# Patient Record
Sex: MALE | Race: WHITE | NOT HISPANIC OR LATINO | Employment: OTHER | URBAN - METROPOLITAN AREA
[De-identification: names, ages, dates, MRNs, and addresses within clinical notes are randomized per-mention and may not be internally consistent; named-entity substitution may affect disease eponyms.]

---

## 2023-09-03 PROBLEM — E83.52 HYPERCALCEMIA: Status: ACTIVE | Noted: 2023-09-03

## 2023-09-03 PROBLEM — K21.9 GASTROESOPHAGEAL REFLUX: Status: ACTIVE | Noted: 2023-09-03

## 2023-09-03 PROBLEM — K40.90 LEFT INGUINAL HERNIA: Status: ACTIVE | Noted: 2023-09-03

## 2023-09-03 PROBLEM — E11.9 DIABETES MELLITUS (MULTI): Status: ACTIVE | Noted: 2023-09-03

## 2023-09-03 PROBLEM — J45.909 ASTHMA (HHS-HCC): Status: ACTIVE | Noted: 2023-09-03

## 2023-09-03 PROBLEM — I48.0 PAROXYSMAL A-FIB (MULTI): Status: ACTIVE | Noted: 2023-09-03

## 2023-09-03 PROBLEM — J22 LOWER RESPIRATORY INFECTION (E.G., BRONCHITIS, PNEUMONIA, PNEUMONITIS, PULMONITIS): Status: ACTIVE | Noted: 2023-09-03

## 2023-09-03 PROBLEM — R07.89 NON-CARDIAC CHEST PAIN: Status: ACTIVE | Noted: 2023-09-03

## 2023-09-03 PROBLEM — E78.00 HYPERCHOLESTEROLEMIA: Status: ACTIVE | Noted: 2023-09-03

## 2023-09-03 PROBLEM — I25.10 MILD CAD: Status: ACTIVE | Noted: 2023-09-03

## 2023-09-03 PROBLEM — Z95.3 S/P AORTIC VALVE REPLACEMENT WITH BIOPROSTHETIC VALVE: Status: ACTIVE | Noted: 2023-09-03

## 2023-09-03 PROBLEM — R06.02 SHORTNESS OF BREATH: Status: ACTIVE | Noted: 2023-09-03

## 2023-09-03 PROBLEM — E21.0 PRIMARY HYPERPARATHYROIDISM (MULTI): Status: ACTIVE | Noted: 2023-09-03

## 2023-09-03 PROBLEM — E83.41 HYPERMAGNESEMIA: Status: ACTIVE | Noted: 2023-09-03

## 2023-09-03 PROBLEM — I10 HYPERTENSION, BENIGN: Status: ACTIVE | Noted: 2023-09-03

## 2023-09-03 RX ORDER — CINACALCET 30 MG/1
90 TABLET, FILM COATED ORAL
COMMUNITY

## 2023-09-03 RX ORDER — ASPIRIN 81 MG/1
1 TABLET ORAL DAILY
COMMUNITY

## 2023-09-03 RX ORDER — LANOLIN ALCOHOL/MO/W.PET/CERES
1 CREAM (GRAM) TOPICAL DAILY
COMMUNITY
Start: 2021-12-21 | End: 2023-10-13 | Stop reason: ALTCHOICE

## 2023-09-03 RX ORDER — METOPROLOL TARTRATE 50 MG/1
1 TABLET ORAL EVERY 12 HOURS
COMMUNITY
Start: 2019-02-27

## 2023-09-03 RX ORDER — TRAVOPROST OPHTHALMIC SOLUTION 0.04 MG/ML
1 SOLUTION OPHTHALMIC DAILY
COMMUNITY
Start: 2017-03-02 | End: 2023-11-17 | Stop reason: ALTCHOICE

## 2023-09-03 RX ORDER — TAMSULOSIN HYDROCHLORIDE 0.4 MG/1
1 CAPSULE ORAL DAILY
COMMUNITY

## 2023-09-03 RX ORDER — METFORMIN HYDROCHLORIDE 500 MG/1
1 TABLET, EXTENDED RELEASE ORAL 2 TIMES DAILY
COMMUNITY
Start: 2017-01-18

## 2023-09-03 RX ORDER — ALBUTEROL SULFATE 90 UG/1
AEROSOL, METERED RESPIRATORY (INHALATION)
COMMUNITY
End: 2023-11-17 | Stop reason: ALTCHOICE

## 2023-09-03 RX ORDER — DOFETILIDE 0.25 MG/1
1 CAPSULE ORAL 2 TIMES DAILY
COMMUNITY
Start: 2018-12-17

## 2023-09-03 RX ORDER — WARFARIN 3 MG/1
1 TABLET ORAL DAILY
COMMUNITY
End: 2023-10-02 | Stop reason: DRUGHIGH

## 2023-09-03 RX ORDER — CALCIUM CARBONATE 300MG(750)
400 TABLET,CHEWABLE ORAL 2 TIMES DAILY
COMMUNITY
End: 2023-11-17 | Stop reason: SDUPTHER

## 2023-09-03 RX ORDER — ROSUVASTATIN CALCIUM 5 MG/1
1 TABLET, COATED ORAL DAILY
COMMUNITY
Start: 2020-03-23 | End: 2023-10-11 | Stop reason: SDUPTHER

## 2023-09-03 RX ORDER — LISINOPRIL 40 MG/1
20 TABLET ORAL DAILY
COMMUNITY

## 2023-09-03 RX ORDER — HYDROCHLOROTHIAZIDE 25 MG/1
1 TABLET ORAL DAILY
COMMUNITY
Start: 2022-08-30 | End: 2023-10-13 | Stop reason: ALTCHOICE

## 2023-09-03 RX ORDER — BUDESONIDE AND FORMOTEROL FUMARATE DIHYDRATE 160; 4.5 UG/1; UG/1
AEROSOL RESPIRATORY (INHALATION)
COMMUNITY
Start: 2021-11-03 | End: 2023-10-13 | Stop reason: ALTCHOICE

## 2023-10-02 DIAGNOSIS — I48.21 PERMANENT ATRIAL FIBRILLATION (MULTI): Primary | ICD-10-CM

## 2023-10-02 RX ORDER — WARFARIN 3 MG/1
TABLET ORAL
Qty: 130 TABLET | Refills: 1 | Status: SHIPPED | OUTPATIENT
Start: 2023-10-02 | End: 2024-03-28 | Stop reason: DRUGHIGH

## 2023-10-10 ENCOUNTER — OFFICE VISIT (OUTPATIENT)
Dept: CARDIOLOGY | Facility: CLINIC | Age: 83
End: 2023-10-10
Payer: MEDICARE

## 2023-10-10 VITALS
OXYGEN SATURATION: 97 % | SYSTOLIC BLOOD PRESSURE: 130 MMHG | BODY MASS INDEX: 31.1 KG/M2 | WEIGHT: 169 LBS | HEART RATE: 54 BPM | DIASTOLIC BLOOD PRESSURE: 74 MMHG | HEIGHT: 62 IN

## 2023-10-10 DIAGNOSIS — I48.0 PAROXYSMAL A-FIB (MULTI): ICD-10-CM

## 2023-10-10 DIAGNOSIS — Z95.3 S/P AORTIC VALVE REPLACEMENT WITH BIOPROSTHETIC VALVE: ICD-10-CM

## 2023-10-10 DIAGNOSIS — I25.10 MILD CAD: Primary | ICD-10-CM

## 2023-10-10 DIAGNOSIS — I10 HYPERTENSION, BENIGN: ICD-10-CM

## 2023-10-10 PROCEDURE — 3078F DIAST BP <80 MM HG: CPT | Performed by: STUDENT IN AN ORGANIZED HEALTH CARE EDUCATION/TRAINING PROGRAM

## 2023-10-10 PROCEDURE — 1126F AMNT PAIN NOTED NONE PRSNT: CPT | Performed by: STUDENT IN AN ORGANIZED HEALTH CARE EDUCATION/TRAINING PROGRAM

## 2023-10-10 PROCEDURE — 93000 ELECTROCARDIOGRAM COMPLETE: CPT | Performed by: STUDENT IN AN ORGANIZED HEALTH CARE EDUCATION/TRAINING PROGRAM

## 2023-10-10 PROCEDURE — 3075F SYST BP GE 130 - 139MM HG: CPT | Performed by: STUDENT IN AN ORGANIZED HEALTH CARE EDUCATION/TRAINING PROGRAM

## 2023-10-10 PROCEDURE — 99213 OFFICE O/P EST LOW 20 MIN: CPT | Performed by: STUDENT IN AN ORGANIZED HEALTH CARE EDUCATION/TRAINING PROGRAM

## 2023-10-10 PROCEDURE — 1036F TOBACCO NON-USER: CPT | Performed by: STUDENT IN AN ORGANIZED HEALTH CARE EDUCATION/TRAINING PROGRAM

## 2023-10-10 RX ORDER — BRIMONIDINE TARTRATE 1 MG/ML
1 SOLUTION/ DROPS OPHTHALMIC 2 TIMES DAILY
COMMUNITY

## 2023-10-10 RX ORDER — BLOOD SUGAR DIAGNOSTIC
1 STRIP MISCELLANEOUS DAILY
COMMUNITY
Start: 2023-01-05

## 2023-10-10 RX ORDER — ASPIRIN 81 MG/1
81 TABLET ORAL ONCE
COMMUNITY
Start: 2018-02-01 | End: 2023-10-13 | Stop reason: SDUPTHER

## 2023-10-10 RX ORDER — FLUTICASONE PROPIONATE 220 UG/1
1 AEROSOL, METERED RESPIRATORY (INHALATION)
COMMUNITY
Start: 2013-09-13 | End: 2023-11-17 | Stop reason: ALTCHOICE

## 2023-10-10 RX ORDER — FLUTICASONE FUROATE AND VILANTEROL TRIFENATATE 200; 25 UG/1; UG/1
1 POWDER RESPIRATORY (INHALATION) DAILY
COMMUNITY

## 2023-10-10 RX ORDER — LATANOPROST 50 UG/ML
1 SOLUTION/ DROPS OPHTHALMIC DAILY
COMMUNITY

## 2023-10-10 ASSESSMENT — PAIN SCALES - GENERAL: PAINLEVEL: 0-NO PAIN

## 2023-10-10 NOTE — PROGRESS NOTES
"Chief Complaint:   6 month f/u EKG also needs refills     History Of Present Illness:    Rios Alston Jr. is a 83 y.o. male presenting for follow-up appointment.  He denies chest pain shortness of breath palpitations.  Has been in his usual state of health since his last appointment.  No recent hospitalizations.  He is compliant with his current meds.  He does have a dental appointment coming up for tooth extraction.     Last Recorded Vitals:  Vitals:    10/10/23 1057   BP: 130/74   BP Location: Right arm   Patient Position: Sitting   BP Cuff Size: Adult   Pulse: 54   SpO2: 97%   Weight: 76.7 kg (169 lb)   Height: 1.575 m (5' 2\")       Past Medical History:  He has a past medical history of Personal history of other medical treatment, Personal history of other medical treatment, Personal history of other medical treatment, Personal history of other medical treatment, Personal history of other medical treatment, and Pleurodynia (09/22/2021).    Past Surgical History:  He has a past surgical history that includes Colonoscopy (01/11/2017) and Other surgical history (01/17/2018).      Social History:  He reports that he has never smoked. He has never used smokeless tobacco. No history on file for alcohol use and drug use.    Family History:  Family History   Problem Relation Name Age of Onset    Other (cardiac disease) Other Family Members         Multiple family members        Allergies:  Patient has no known allergies.    Outpatient Medications:  Current Outpatient Medications   Medication Instructions    Accu-Chek Marisela Plus test strp strip 1 each, subdermal, Daily    albuterol (Ventolin HFA) 90 mcg/actuation inhaler inhalation    Alphagan P 0.1 % ophthalmic solution 1 drop, Both Eyes, 2 times daily    aspirin 81 mg EC tablet 1 tablet, oral, Daily    aspirin 81 mg, oral, Once    Breo Ellipta 200-25 mcg/dose inhaler 1 puff, inhalation, Daily    budesonide-formoteroL (Symbicort) 160-4.5 mcg/actuation inhaler " inhalation    cinacalcet (SENSIPAR) 90 mg, oral, Weekly, Take with food or shortly afer a meal. Swallow tablet whole; do not break or divide.    dofetilide (Tikosyn) 250 mcg capsule 1 capsule, oral, 2 times daily    fluticasone (Flovent HFA) 220 mcg/actuation inhaler 1 puff, inhalation, 2 times daily RT    hydroCHLOROthiazide (HYDRODiuril) 25 mg tablet 1 tablet, oral, Daily    latanoprost (Xalatan) 0.005 % ophthalmic solution 1 drop, Both Eyes, Daily    lisinopril 40 mg tablet 1 tablet, oral, Daily    magnesium oxide (Mag-Ox) 400 mg (241.3 mg magnesium) tablet 1 tablet, oral, Daily    magnesium oxide (MAG-OX) 400 mg, oral, 2 times daily    metFORMIN  mg 24 hr tablet 1 tablet, oral, 2 times daily    metoprolol tartrate (Lopressor) 50 mg tablet 1 tablet, oral, Every 12 hours    rosuvastatin (Crestor) 5 mg tablet 1 tablet, oral, Daily    tamsulosin (Flomax) 0.4 mg 24 hr capsule 1 capsule, oral, Daily    travoprost (Travatan Z) 0.004 % drops ophthalmic solution 1 drop, ophthalmic (eye), Daily    warfarin (Coumadin) 3 mg tablet Take as directed per After Visit Summary.       Physical Exam:  General: No acute distress,  A&O x3  Skin: Warm and dry  Neck: JVD is not elevated  ENT: Moist mucous membranes no lesions appreciated  Pulmonary: CTAB  Cards: Regular rate rhythm, a 2 out of 6 to 3 out of 6 systolic murmur appreciated across the aortic valve  Abdomen: Soft nontender nondistended  Extremities: No edema or cyanosis  Psych: Appropriate mood and affect        Last Labs:  CBC -  Lab Results   Component Value Date    WBC 5.9 08/02/2021    HGB 12.6 (L) 08/02/2021    HCT 38.5 (L) 08/02/2021    MCV 89 08/02/2021     08/02/2021       CMP -  Lab Results   Component Value Date    CALCIUM 11.1 (H) 09/16/2022    PHOS 2.0 (L) 09/16/2022    ALBUMIN 4.2 09/16/2022       LIPID PANEL -   Lab Results   Component Value Date    CHOL 125 09/16/2022    TRIG 137 09/16/2022    HDL 53.0 09/16/2022    CHHDL 2.4 09/16/2022    LDLF  45 09/16/2022    VLDL 27 09/16/2022       RENAL FUNCTION PANEL -   Lab Results   Component Value Date    GLUCOSE 135 (H) 09/16/2022     (L) 09/16/2022    K 4.8 09/16/2022     09/16/2022    CO2 27 09/16/2022    ANIONGAP 12 09/16/2022    BUN 13 09/16/2022    CREATININE 1.31 (H) 09/16/2022    GFRMALE 55 (A) 09/16/2022    CALCIUM 11.1 (H) 09/16/2022    PHOS 2.0 (L) 09/16/2022    ALBUMIN 4.2 09/16/2022        Lab Results   Component Value Date    HGBA1C 7.3 (A) 10/14/2022       Last Cardiology Tests:  ECG:  ECG 12 lead (Clinic Performed) 10/10/2023    Assessment/Plan     1. History of aortic valve disease status post AVR. Most recent echo shows a mean gradient of 11 without significant valvular or perivalvular regurgitation.   Continue aspirin indefinitely. Endocarditis prophylaxis where appropriate.     2. History of mild CAD: No true active angina symptoms. Continue patient on aspirin and statin therapy.     3. Atypical chest pain: Reproducible most consistent with musculoskeletal origin. Probably related to prior history of median sternotomy from aortic valve surgery and prior fall and injury.     4. Paroxysmal atrial fibrillation: Managed with Tikosyn and metoprolol. Status post PVI at the developing A. fib while on Tikosyn.  Sinus bradycardia with first-degree AV block with EKG today.  Anticoagulation with warfarin. Followed by EP.      5. Hypertension: Blood pressure is at goal today.         Abundio Wheeler MD PhD

## 2023-10-11 DIAGNOSIS — I25.10 MILD CAD: ICD-10-CM

## 2023-10-11 DIAGNOSIS — E78.00 HYPERCHOLESTEROLEMIA: Primary | ICD-10-CM

## 2023-10-11 RX ORDER — ROSUVASTATIN CALCIUM 5 MG/1
5 TABLET, COATED ORAL DAILY
Qty: 90 TABLET | Refills: 3 | Status: CANCELLED | OUTPATIENT
Start: 2023-10-11

## 2023-10-12 RX ORDER — ROSUVASTATIN CALCIUM 5 MG/1
5 TABLET, COATED ORAL DAILY
Qty: 90 TABLET | Refills: 3 | Status: SHIPPED | OUTPATIENT
Start: 2023-10-12

## 2023-10-13 ENCOUNTER — ANTICOAGULATION - WARFARIN VISIT (OUTPATIENT)
Dept: PHARMACY | Facility: CLINIC | Age: 83
End: 2023-10-13
Payer: MEDICARE

## 2023-10-13 DIAGNOSIS — I48.21 PERMANENT ATRIAL FIBRILLATION (MULTI): Primary | ICD-10-CM

## 2023-10-13 LAB
POC INR: 2.8
POC PROTHROMBIN TIME: NORMAL

## 2023-10-13 PROCEDURE — 99212 OFFICE O/P EST SF 10 MIN: CPT | Performed by: PHARMACIST

## 2023-10-13 PROCEDURE — 85610 PROTHROMBIN TIME: CPT | Performed by: PHARMACIST

## 2023-10-13 RX ORDER — AMLODIPINE BESYLATE 5 MG/1
5 TABLET ORAL DAILY
COMMUNITY

## 2023-10-13 NOTE — PROGRESS NOTES
Pt is an 84 y/o male with history of atrial fibrillation who presents today for anticoagulation monitoring and adjustment.  INR 2.8 is therapeutic for this patient (goal range 2-3) and is reflective of 28.5 mg TWD  Patient verifies current dosing regimen, patient able to verbally recall dose  Patient reports 1 missed doses since last INR- patient does not recall for sure if dose was missed, was >1 week ago.   Patient denies s/sx clotting and/or stroke  Patient denies hematuria, epistaxis, rectal bleeding- patient stated that he fell in the kitchen 3 days ago and has a bruise on his ankle and elbow from hitting the counter. He did go to ED, and everything checked out OK - did not hit his head.   Patient denies changes in diet, alcohol, or tobacco use  Vegetable intake consistent from week to week  Reviewed medication list and drug allergies with patient, updated any medication additions or modifications accordingly  Acetaminophen intake: no changes   Patient is going to have another tooth (1 tooth) being pulled, date TBD. Instructed patient to discuss with his dentist and will let us know what they say.    Patient was instructed to continue with current therapy of warfarin 28.5mg  and RTC 5 weeks    Anny Santiago, MarniD, BCPS

## 2023-11-17 ENCOUNTER — ANTICOAGULATION - WARFARIN VISIT (OUTPATIENT)
Dept: PHARMACY | Facility: CLINIC | Age: 83
End: 2023-11-17
Payer: MEDICARE

## 2023-11-17 DIAGNOSIS — I48.0 PAROXYSMAL A-FIB (MULTI): ICD-10-CM

## 2023-11-17 DIAGNOSIS — I48.21 PERMANENT ATRIAL FIBRILLATION (MULTI): Primary | ICD-10-CM

## 2023-11-17 LAB
POC INR: 2.7
POC PROTHROMBIN TIME: NORMAL

## 2023-11-17 PROCEDURE — 99212 OFFICE O/P EST SF 10 MIN: CPT | Performed by: PHARMACIST

## 2023-11-17 PROCEDURE — 85610 PROTHROMBIN TIME: CPT | Mod: QW | Performed by: PHARMACIST

## 2023-11-17 RX ORDER — CALCIUM CARBONATE 300MG(750)
400 TABLET,CHEWABLE ORAL 2 TIMES DAILY
Qty: 180 TABLET | Refills: 0 | Status: SHIPPED | OUTPATIENT
Start: 2023-11-17 | End: 2024-04-10 | Stop reason: SDUPTHER

## 2023-11-17 NOTE — PROGRESS NOTES
Rios Alston Jr. is a 83 y.o. male with history of atrial fibrillation who presents today for anticoagulation monitoring and adjustment.  INR 2.7 is therapeutic for this patient (goal range 2-3) and is reflective of 28.5 mg TWD  Patient verifies current dosing regimen, patient able to verbally recall dose  Patient reports 0 missed doses since last INR  Last INR 2.8 on 10/13/23 (5 week interval)  Patient denies s/sx clotting and/or stroke  Patient denies hematuria, epistaxis, rectal bleeding  Patient denies changes in diet, alcohol, or tobacco use  Vegetable intake consistent from week to week  Reviewed medication list and drug allergies with patient, updated any medication additions or modifications accordingly  Acetaminophen intake: no changes     Patient is having a tooth pulled, has appointment this coming Monday regarding scheduling this procedure -- patient will call us and let us know if / how much he is to be holding his warfarin therapy. Also may be having eye surgery - has appointment for follow up with this at the end of the month. Patient will call us with info.     Patient was instructed to continue with current therapy of warfarin 28.5mg TWD and RTC 5 weeks    Anny Santiago, PharmD, BCPS

## 2023-12-22 ENCOUNTER — ANTICOAGULATION - WARFARIN VISIT (OUTPATIENT)
Dept: PHARMACY | Facility: CLINIC | Age: 83
End: 2023-12-22
Payer: MEDICARE

## 2023-12-22 DIAGNOSIS — I48.21 PERMANENT ATRIAL FIBRILLATION (MULTI): Primary | ICD-10-CM

## 2023-12-22 LAB
POC INR: 2.6
POC PROTHROMBIN TIME: NORMAL

## 2023-12-22 PROCEDURE — 85610 PROTHROMBIN TIME: CPT | Mod: QW | Performed by: PHARMACIST

## 2023-12-22 PROCEDURE — 99212 OFFICE O/P EST SF 10 MIN: CPT | Performed by: PHARMACIST

## 2023-12-22 NOTE — PROGRESS NOTES
Verified current dose with pt.    No new meds or med changes since last visit.  Pt denies unusual bleed/bruise  Pt says no upcoming procedures (last note had 2 possible procedures)  Inr = 2.6  Continue same dose and check again in 5 weeks.

## 2023-12-23 ENCOUNTER — HOSPITAL ENCOUNTER (OUTPATIENT)
Dept: RADIOLOGY | Facility: HOSPITAL | Age: 83
Discharge: HOME | End: 2023-12-23
Payer: MEDICARE

## 2023-12-23 DIAGNOSIS — R93.89 ABNORMAL FINDINGS ON DIAGNOSTIC IMAGING OF OTHER SPECIFIED BODY STRUCTURES: ICD-10-CM

## 2023-12-23 DIAGNOSIS — E04.2 NONTOXIC MULTINODULAR GOITER: ICD-10-CM

## 2023-12-23 PROCEDURE — 76536 US EXAM OF HEAD AND NECK: CPT | Performed by: RADIOLOGY

## 2023-12-23 PROCEDURE — 76536 US EXAM OF HEAD AND NECK: CPT

## 2024-01-19 ENCOUNTER — ANTICOAGULATION - WARFARIN VISIT (OUTPATIENT)
Dept: PHARMACY | Facility: CLINIC | Age: 84
End: 2024-01-19
Payer: MEDICARE

## 2024-01-19 DIAGNOSIS — I48.21 PERMANENT ATRIAL FIBRILLATION (MULTI): Primary | ICD-10-CM

## 2024-01-19 NOTE — PROGRESS NOTES
Patient rescheduled INR for 1/25/24 (was 1/26/24) - updated INR tracker to reflect patient's next appt date     Anny Santiago PharmD, BCPS   1/19/2024 3:03 PM

## 2024-01-25 ENCOUNTER — ANTICOAGULATION - WARFARIN VISIT (OUTPATIENT)
Dept: PHARMACY | Facility: CLINIC | Age: 84
End: 2024-01-25
Payer: MEDICARE

## 2024-01-25 ENCOUNTER — CLINICAL SUPPORT (OUTPATIENT)
Dept: PHARMACY | Facility: CLINIC | Age: 84
End: 2024-01-25
Payer: MEDICARE

## 2024-01-25 DIAGNOSIS — I48.21 PERMANENT ATRIAL FIBRILLATION (MULTI): Primary | ICD-10-CM

## 2024-01-25 LAB
POC INR: 2.7
POC PROTHROMBIN TIME: NORMAL

## 2024-01-25 PROCEDURE — 85610 PROTHROMBIN TIME: CPT | Mod: QW | Performed by: PHARMACIST

## 2024-01-25 PROCEDURE — 99212 OFFICE O/P EST SF 10 MIN: CPT | Performed by: PHARMACIST

## 2024-01-25 NOTE — PROGRESS NOTES
Rios Alston Jr. is a 83 y.o. male with history of A fib who presents today for anticoagulation monitoring and adjustment.  INR 2.7 is therapeutic for this patient (goal range 2-3) and is reflective of 28.5 mg TWD  Patient verifies current dosing regimen, patient able to verbally recall dose  Patient reports griffin  missed doses since last INR  Last INR 2.6 on 12/22/23 (5 week interval)  Patient denies s/sx clotting and/or stroke  Patient denies hematuria, epistaxis, rectal bleeding  Patient denies changes in diet, alcohol, or tobacco use  Vegetable intake consistent from week to week  Reviewed medication list and drug allergies with patient, updated any medication additions or modifications accordingly  Acetaminophen intake: no changes   Patient also denies any pending medical or dental procedures scheduled at this time  Patient was instructed to Keep current TWD of 28.5 and RTC 5 weeks    Anny Santiago, MarniD, BCPS   1/25/2024 10:36 AM

## 2024-01-26 ENCOUNTER — APPOINTMENT (OUTPATIENT)
Dept: PHARMACY | Facility: CLINIC | Age: 84
End: 2024-01-26
Payer: MEDICARE

## 2024-02-06 DIAGNOSIS — I48.0 PAROXYSMAL A-FIB (MULTI): Primary | ICD-10-CM

## 2024-02-29 ENCOUNTER — CLINICAL SUPPORT (OUTPATIENT)
Dept: PHARMACY | Facility: CLINIC | Age: 84
End: 2024-02-29
Payer: MEDICARE

## 2024-02-29 ENCOUNTER — ANTICOAGULATION - WARFARIN VISIT (OUTPATIENT)
Dept: PHARMACY | Facility: CLINIC | Age: 84
End: 2024-02-29
Payer: MEDICARE

## 2024-02-29 DIAGNOSIS — I48.21 PERMANENT ATRIAL FIBRILLATION (MULTI): Primary | ICD-10-CM

## 2024-02-29 DIAGNOSIS — I48.0 PAROXYSMAL A-FIB (MULTI): ICD-10-CM

## 2024-02-29 LAB
POC INR: 2.3
POC PROTHROMBIN TIME: NORMAL

## 2024-02-29 PROCEDURE — 99212 OFFICE O/P EST SF 10 MIN: CPT | Performed by: PHARMACIST

## 2024-02-29 PROCEDURE — 85610 PROTHROMBIN TIME: CPT | Mod: QW | Performed by: PHARMACIST

## 2024-02-29 NOTE — PROGRESS NOTES
Verified current dose with pt.    No new meds or med changes since last visit.  Pt denies unusual bleed/bruise.  Pt having cataract surgery both eyes in march and April.  No need to hold warfarin, but reminded pt to remind his eye doctor he is on warfarin.    Inr = 2.3  Continue same dose and check again in 5 weeks.       No complaints

## 2024-03-28 DIAGNOSIS — I48.21 PERMANENT ATRIAL FIBRILLATION (MULTI): Primary | ICD-10-CM

## 2024-03-28 RX ORDER — WARFARIN 3 MG/1
TABLET ORAL
Qty: 130 TABLET | Refills: 1 | Status: SHIPPED | OUTPATIENT
Start: 2024-03-28 | End: 2025-03-28

## 2024-03-28 RX ORDER — WARFARIN 3 MG/1
TABLET ORAL
Qty: 130 TABLET | Refills: 1 | OUTPATIENT
Start: 2024-03-28

## 2024-04-02 PROBLEM — R05.3 PERSISTENT COUGH: Status: ACTIVE | Noted: 2024-04-02

## 2024-04-04 ENCOUNTER — ANTICOAGULATION - WARFARIN VISIT (OUTPATIENT)
Dept: PHARMACY | Facility: CLINIC | Age: 84
End: 2024-04-04
Payer: MEDICARE

## 2024-04-04 ENCOUNTER — APPOINTMENT (OUTPATIENT)
Dept: PHARMACY | Facility: CLINIC | Age: 84
End: 2024-04-04
Payer: MEDICARE

## 2024-04-04 DIAGNOSIS — I48.0 PAROXYSMAL A-FIB (MULTI): ICD-10-CM

## 2024-04-04 DIAGNOSIS — I48.21 PERMANENT ATRIAL FIBRILLATION (MULTI): Primary | ICD-10-CM

## 2024-04-04 LAB
POC INR: 2.8
POC PROTHROMBIN TIME: NORMAL

## 2024-04-04 PROCEDURE — 85610 PROTHROMBIN TIME: CPT | Mod: QW

## 2024-04-04 PROCEDURE — 99212 OFFICE O/P EST SF 10 MIN: CPT

## 2024-04-04 NOTE — PROGRESS NOTES
Coumadin Clinic Visit Note    Patient verified warfarin dose  No missed doses  No unusual bruising or bleeding  No changes to medications  Consistent dietary green intake  Patient has cataract surgery other eye next week but will not have to hold warfarin   No anticipated procedures at this time   INR Therapeutic today at 2.8  No changes to warfarin dose today  Next appointment 5 weeks    Sparkle Alfredo, Pharm D

## 2024-04-10 ENCOUNTER — OFFICE VISIT (OUTPATIENT)
Dept: CARDIOLOGY | Facility: CLINIC | Age: 84
End: 2024-04-10
Payer: MEDICARE

## 2024-04-10 VITALS
HEART RATE: 58 BPM | BODY MASS INDEX: 30.33 KG/M2 | WEIGHT: 164.8 LBS | HEIGHT: 62 IN | SYSTOLIC BLOOD PRESSURE: 122 MMHG | OXYGEN SATURATION: 97 % | DIASTOLIC BLOOD PRESSURE: 66 MMHG

## 2024-04-10 DIAGNOSIS — I10 HYPERTENSION, BENIGN: ICD-10-CM

## 2024-04-10 DIAGNOSIS — E78.00 HYPERCHOLESTEROLEMIA: Primary | ICD-10-CM

## 2024-04-10 DIAGNOSIS — I25.10 MILD CAD: ICD-10-CM

## 2024-04-10 DIAGNOSIS — Z95.3 S/P AORTIC VALVE REPLACEMENT WITH BIOPROSTHETIC VALVE: ICD-10-CM

## 2024-04-10 DIAGNOSIS — I48.0 PAROXYSMAL A-FIB (MULTI): ICD-10-CM

## 2024-04-10 PROCEDURE — 3078F DIAST BP <80 MM HG: CPT | Performed by: STUDENT IN AN ORGANIZED HEALTH CARE EDUCATION/TRAINING PROGRAM

## 2024-04-10 PROCEDURE — 1159F MED LIST DOCD IN RCRD: CPT | Performed by: STUDENT IN AN ORGANIZED HEALTH CARE EDUCATION/TRAINING PROGRAM

## 2024-04-10 PROCEDURE — 99213 OFFICE O/P EST LOW 20 MIN: CPT | Performed by: STUDENT IN AN ORGANIZED HEALTH CARE EDUCATION/TRAINING PROGRAM

## 2024-04-10 PROCEDURE — 3074F SYST BP LT 130 MM HG: CPT | Performed by: STUDENT IN AN ORGANIZED HEALTH CARE EDUCATION/TRAINING PROGRAM

## 2024-04-10 RX ORDER — CALCIUM CARBONATE 300MG(750)
400 TABLET,CHEWABLE ORAL 2 TIMES DAILY
Qty: 180 TABLET | Refills: 0 | Status: SHIPPED | OUTPATIENT
Start: 2024-04-10

## 2024-04-10 NOTE — PROGRESS NOTES
"Chief Complaint:   Follow-up (6 month follow up )     History Of Present Illness:    Rios Alston Jr. is a 84 y.o. male returns clinic for follow-up appointment.  He denies chest pain shortness of breath or palpitations.  No recent hospitalizations.  Scheduled to undergo eye surgery tomorrow.  He is compliant with his meds.  Blood pressure is 122/66 with a heart rate of 57.     Last Recorded Vitals:  Vitals:    04/10/24 1101   BP: 122/66   BP Location: Left arm   Patient Position: Sitting   BP Cuff Size: Adult   Pulse: 58   SpO2: 97%   Weight: 74.8 kg (164 lb 12.8 oz)   Height: 1.575 m (5' 2\")       Review of Systems  ROS      Allergies:  Patient has no known allergies.    Outpatient Medications:  Current Outpatient Medications   Medication Instructions    Accu-Chek Marisela Plus test strp strip 1 each, subdermal, Daily    Alphagan P 0.1 % ophthalmic solution 1 drop, Both Eyes, 2 times daily    amLODIPine (NORVASC) 5 mg, oral, Daily    aspirin 81 mg EC tablet 1 tablet, oral, Daily    Breo Ellipta 200-25 mcg/dose inhaler 1 puff, inhalation, Daily    cinacalcet (SENSIPAR) 90 mg, oral, Every Mon/Wed/Fri, Take with food or shortly afer a meal. Swallow tablet whole; do not break or divide.    dofetilide (Tikosyn) 250 mcg capsule 1 capsule, oral, 2 times daily    latanoprost (Xalatan) 0.005 % ophthalmic solution 1 drop, Both Eyes, Daily    lisinopril 20 mg, oral, Daily    magnesium oxide (MAG-OX) 400 mg, oral, 2 times daily    metFORMIN  mg 24 hr tablet 1 tablet, oral, 2 times daily    metoprolol tartrate (Lopressor) 50 mg tablet 1 tablet, oral, Every 12 hours    rosuvastatin (CRESTOR) 5 mg, oral, Daily    tamsulosin (Flomax) 0.4 mg 24 hr capsule 1 capsule, oral, Daily    warfarin (Coumadin) 3 mg tablet Take 3 mg (1 tablet) every Sunday and Wednesday and 4.5 mg (1.5 tablets) all other days or as directed by the anticoagulation clinic.       Physical Exam:  General: No acute distress,  A&O x3, frail  Skin: Warm and " "dry  Neck: JVD is not elevated  ENT: Moist mucous membranes no lesions appreciated  Pulmonary: Mildly diminished breath sounds but otherwise clear to auscultation  Cards: Regular rate rhythm, no murmurs gallops or rubs appreciated normal S1-S2  Abdomen: Soft nontender nondistended  Extremities: No edema or cyanosis  Psych: Appropriate mood and affect        Last Labs:  CBC -  Lab Results   Component Value Date    WBC 5.9 08/02/2021    HGB 12.6 (L) 08/02/2021    HCT 38.5 (L) 08/02/2021    MCV 89 08/02/2021     08/02/2021       CMP -  Lab Results   Component Value Date    CALCIUM 11.1 (H) 09/16/2022    PHOS 2.0 (L) 09/16/2022    ALBUMIN 4.2 09/16/2022       LIPID PANEL -   Lab Results   Component Value Date    CHOL 125 09/16/2022    TRIG 137 09/16/2022    HDL 53.0 09/16/2022    CHHDL 2.4 09/16/2022    LDLF 45 09/16/2022    VLDL 27 09/16/2022       RENAL FUNCTION PANEL -   Lab Results   Component Value Date    GLUCOSE 135 (H) 09/16/2022     (L) 09/16/2022    K 4.8 09/16/2022     09/16/2022    CO2 27 09/16/2022    ANIONGAP 12 09/16/2022    BUN 13 09/16/2022    CREATININE 1.31 (H) 09/16/2022    GFRMALE 55 (A) 09/16/2022    CALCIUM 11.1 (H) 09/16/2022    PHOS 2.0 (L) 09/16/2022    ALBUMIN 4.2 09/16/2022        Lab Results   Component Value Date    HGBA1C 7.3 (A) 10/14/2022       Last Cardiology Tests:  ECG:  Encounter Date: 10/10/23   ECG 12 lead (Clinic Performed)    Narrative    Sinus bradycardia with 1st avb        Echo:  No results found for this or any previous visit from the past 1095 days.       Ejection Fractions:  No results found for: \"EF\"    Assessment and Plan    1. History of aortic valve disease status post AVR. Most recent echo shows a mean gradient of 11 without significant valvular or perivalvular regurgitation.   Continue aspirin indefinitely. Endocarditis prophylaxis where appropriate.     2. History of mild CAD: No true active angina symptoms. Continue patient on aspirin and statin " therapy.  Lipid panel ordered.     3. Atypical chest pain: Reproducible most consistent with musculoskeletal origin. Probably related to prior history of median sternotomy from aortic valve surgery and prior fall and injury.     4. Paroxysmal atrial fibrillation: Managed with Tikosyn and metoprolol. Status post PVI at the developing A. fib while on Tikosyn.  Baseline ECG blood pressures blood pressures are sinus bradycardia with first-degree AV block with EKG today.  Anticoagulation with warfarin. Followed by EP.      5. Hypertension: Blood pressure is at goal today.      (This note was generated with voice recognition software and may contain errors including spelling, grammar, syntax and missed recognition of what was dictated, of which may not have been fully corrected)     Abundio Wheeler MD PhD

## 2024-05-09 ENCOUNTER — ANTICOAGULATION - WARFARIN VISIT (OUTPATIENT)
Dept: PHARMACY | Facility: CLINIC | Age: 84
End: 2024-05-09
Payer: MEDICARE

## 2024-05-09 DIAGNOSIS — I48.0 PAROXYSMAL A-FIB (MULTI): ICD-10-CM

## 2024-05-09 DIAGNOSIS — I48.21 PERMANENT ATRIAL FIBRILLATION (MULTI): Primary | ICD-10-CM

## 2024-05-09 LAB
POC INR: 2.3
POC PROTHROMBIN TIME: NORMAL

## 2024-05-09 PROCEDURE — 99212 OFFICE O/P EST SF 10 MIN: CPT

## 2024-05-09 PROCEDURE — 85610 PROTHROMBIN TIME: CPT | Mod: QW

## 2024-05-09 NOTE — PROGRESS NOTES
No bleeding or unusual bruising.  Medications and doses verified.  No scheduled procedures at this time.  INR=2.3   Plan: Continue same weekly dose.  Follow up INR check in 5 weeks.

## 2024-06-13 ENCOUNTER — ANTICOAGULATION - WARFARIN VISIT (OUTPATIENT)
Dept: PHARMACY | Facility: CLINIC | Age: 84
End: 2024-06-13
Payer: MEDICARE

## 2024-06-13 DIAGNOSIS — I48.0 PAROXYSMAL A-FIB (MULTI): ICD-10-CM

## 2024-06-13 DIAGNOSIS — I48.21 PERMANENT ATRIAL FIBRILLATION (MULTI): Primary | ICD-10-CM

## 2024-06-13 LAB
POC INR: 2.9
POC PROTHROMBIN TIME: NORMAL

## 2024-06-13 PROCEDURE — 85610 PROTHROMBIN TIME: CPT | Mod: QW | Performed by: PHARMACIST

## 2024-06-13 PROCEDURE — 99212 OFFICE O/P EST SF 10 MIN: CPT | Performed by: PHARMACIST

## 2024-06-13 NOTE — PROGRESS NOTES
Rios Alston Jr. is a 84 y.o. male with history of atrial fibrillation who presents today for anticoagulation monitoring and adjustment.  INR 2.9 is therapeutic for this patient (goal range 2-3) and is reflective of 28.5 mg TWD  Patient verifies current dosing regimen, patient able to verbally recall dose  Patient reports 0 missed doses since last INR  Last INR 2.3 on 5/9/24 (5 week interval)  Patient denies s/sx clotting and/or stroke  Patient denies hematuria, epistaxis, rectal bleeding  Patient denies changes in diet, alcohol, or tobacco use  Vegetable intake consistent from week to week  Reviewed medication list and drug allergies with patient, updated any medication additions or modifications accordingly  Acetaminophen intake: no changes   Patient also denies any pending medical or dental procedures scheduled at this time  Patient was instructed to continue with current therapy of warfarin 28.5mg and RTC 5 weeks    Anny Santiago, MarniD, BCPS   6/13/2024 10:50 AM

## 2024-06-18 ENCOUNTER — HOSPITAL ENCOUNTER (OUTPATIENT)
Dept: RADIOLOGY | Facility: HOSPITAL | Age: 84
Discharge: HOME | End: 2024-06-18
Payer: MEDICARE

## 2024-06-18 DIAGNOSIS — R10.9 UNSPECIFIED ABDOMINAL PAIN: ICD-10-CM

## 2024-06-18 PROCEDURE — 76705 ECHO EXAM OF ABDOMEN: CPT

## 2024-06-18 PROCEDURE — 76705 ECHO EXAM OF ABDOMEN: CPT | Performed by: RADIOLOGY

## 2024-06-24 ENCOUNTER — APPOINTMENT (OUTPATIENT)
Dept: CARDIOLOGY | Facility: CLINIC | Age: 84
End: 2024-06-24
Payer: MEDICARE

## 2024-06-24 VITALS
HEIGHT: 62 IN | WEIGHT: 163 LBS | DIASTOLIC BLOOD PRESSURE: 64 MMHG | BODY MASS INDEX: 30 KG/M2 | HEART RATE: 59 BPM | OXYGEN SATURATION: 96 % | SYSTOLIC BLOOD PRESSURE: 112 MMHG

## 2024-06-24 DIAGNOSIS — I48.0 PAROXYSMAL A-FIB (MULTI): ICD-10-CM

## 2024-06-24 PROCEDURE — 3074F SYST BP LT 130 MM HG: CPT | Performed by: INTERNAL MEDICINE

## 2024-06-24 PROCEDURE — 99214 OFFICE O/P EST MOD 30 MIN: CPT | Performed by: INTERNAL MEDICINE

## 2024-06-24 PROCEDURE — 1159F MED LIST DOCD IN RCRD: CPT | Performed by: INTERNAL MEDICINE

## 2024-06-24 PROCEDURE — 1036F TOBACCO NON-USER: CPT | Performed by: INTERNAL MEDICINE

## 2024-06-24 PROCEDURE — 93000 ELECTROCARDIOGRAM COMPLETE: CPT | Performed by: INTERNAL MEDICINE

## 2024-06-24 PROCEDURE — 3078F DIAST BP <80 MM HG: CPT | Performed by: INTERNAL MEDICINE

## 2024-06-24 NOTE — PROGRESS NOTES
"History Of Present Illness:      This is an 84-year-old male with a history of atrial fibrillation.  He is taking dofetilide for maintenance of sinus rhythm.  No side effects related to the use of dofetilide.  No complaints of palpitations or shortness of breath.  He has had atypical noncardiac chest pain intermittently.    Past medical history:    Paroxysmal atrial fibrillation: History of pulmonary vein isolation and antiarrhythmic drug therapy with dofetilide  Mild coronary artery disease  Hypertension  History of aortic valve replacement.    Review of Systems  Other review of systems negative     Last Recorded Vitals:      8/24/2022    11:57 AM 10/19/2022    11:54 AM 2/27/2023     1:57 PM 8/21/2023    10:41 AM 10/10/2023    10:57 AM 4/10/2024    11:01 AM 6/24/2024    10:46 AM   Vitals   Systolic 136 130 147 116 130 122 112   Diastolic 72 70 77 62 74 66 64   Heart Rate  53 51 67 54 58 59   Resp   18       Height (in)   1.575 m (5' 2\") 1.575 m (5' 2\") 1.575 m (5' 2\") 1.575 m (5' 2\") 1.575 m (5' 2\")   Weight (lb)  166.44 164 160 169 164.8 163   BMI  30.44 kg/m2 30 kg/m2 29.26 kg/m2 30.91 kg/m2 30.14 kg/m2 29.81 kg/m2   BSA (m2)  1.82 m2 1.8 m2 1.78 m2 1.83 m2 1.81 m2 1.8 m2   Visit Report     Report Report Report          Allergies:  Patient has no known allergies.    Outpatient Medications:  Current Outpatient Medications   Medication Instructions    Accu-Chek Marisela Plus test strp strip 1 each, subdermal, Daily    Alphagan P 0.1 % ophthalmic solution 1 drop, Both Eyes, 2 times daily    amLODIPine (NORVASC) 5 mg, oral, Daily    aspirin 81 mg EC tablet 1 tablet, oral, Daily    Breo Ellipta 200-25 mcg/dose inhaler 1 puff, inhalation, Daily    cinacalcet (SENSIPAR) 90 mg, oral, Every Mon/Wed/Fri, Take with food or shortly afer a meal. Swallow tablet whole; do not break or divide.    dofetilide (Tikosyn) 250 mcg capsule 1 capsule, oral, 2 times daily    latanoprost (Xalatan) 0.005 % ophthalmic solution 1 drop, Both " Eyes, Daily    lisinopril 20 mg, oral, Daily    magnesium oxide (MAG-OX) 400 mg, oral, 2 times daily    metFORMIN  mg 24 hr tablet 1 tablet, oral, 2 times daily    metoprolol tartrate (Lopressor) 50 mg tablet 1 tablet, oral, Every 12 hours    rosuvastatin (CRESTOR) 5 mg, oral, Daily    tamsulosin (Flomax) 0.4 mg 24 hr capsule 1 capsule, oral, Daily    warfarin (Coumadin) 3 mg tablet Take 3 mg (1 tablet) every Sunday and Wednesday and 4.5 mg (1.5 tablets) all other days or as directed by the anticoagulation clinic.       Physical Exam:    General Appearance:  Alert, oriented, no distress  Skin:  Warm and dry  Head and Neck:  No elevation of JVP, no carotid bruits  Cardiac Exam:  Rhythm is regular, S1 and S2 are normal, no murmur S3 or S4  Lungs:  Clear to auscultation  Extremities:  no edema  Neurologic:  No focal deficits  Psychiatric:  Appropriate mood and behavior         Cardiology Tests:  I have personally review the diagnostic cardiac testing and my interpretation is as follows:    EKG June 24, 2024: Normal sinus rhythm, first-degree AV block, QTc 409 ms.      Assessment/Plan   Problem List Items Addressed This Visit             ICD-10-CM    Paroxysmal A-fib (Multi) I48.0     1.  Paroxysmal atrial fibrillation:  Rios has a history of symptomatic paroxysmal atrial fibrillation which was managed medically for several years on dofetilide.  However, the patient then developed persistent atrial fibrillation in 2021 and pulmonary vein isolation was performed May 13, 2021.  He has been maintaining sinus rhythm and the QTc is stable.  A basic metabolic panel and magnesium level will be obtained.  QTc is stable and was 409 ms on today's EKG.  EP follow-up in 10 to 12 months.         Relevant Orders    ECG 12 lead (Clinic Performed) (Completed)    Basic metabolic panel    Magnesium       James C Ramicone, DO

## 2024-06-24 NOTE — ASSESSMENT & PLAN NOTE
1.  Paroxysmal atrial fibrillation:  Rios has a history of symptomatic paroxysmal atrial fibrillation which was managed medically for several years on dofetilide.  However, the patient then developed persistent atrial fibrillation in 2021 and pulmonary vein isolation was performed May 13, 2021.  He has been maintaining sinus rhythm and the QTc is stable.  A basic metabolic panel and magnesium level will be obtained.  QTc is stable and was 409 ms on today's EKG.  EP follow-up in 10 to 12 months.

## 2024-07-01 DIAGNOSIS — I48.0 PAROXYSMAL A-FIB (MULTI): ICD-10-CM

## 2024-07-02 RX ORDER — LANOLIN ALCOHOL/MO/W.PET/CERES
1 CREAM (GRAM) TOPICAL 2 TIMES DAILY
Qty: 180 TABLET | Refills: 3 | Status: SHIPPED | OUTPATIENT
Start: 2024-07-02

## 2024-07-25 ENCOUNTER — ANTICOAGULATION - WARFARIN VISIT (OUTPATIENT)
Dept: PHARMACY | Facility: CLINIC | Age: 84
End: 2024-07-25
Payer: MEDICARE

## 2024-07-25 DIAGNOSIS — I48.0 PAROXYSMAL A-FIB (MULTI): ICD-10-CM

## 2024-07-25 DIAGNOSIS — I48.21 PERMANENT ATRIAL FIBRILLATION (MULTI): Primary | ICD-10-CM

## 2024-07-25 LAB
POC INR: 3.4
POC PROTHROMBIN TIME: NORMAL

## 2024-07-25 PROCEDURE — 85610 PROTHROMBIN TIME: CPT | Mod: QW

## 2024-07-25 PROCEDURE — 99212 OFFICE O/P EST SF 10 MIN: CPT

## 2024-07-25 NOTE — PROGRESS NOTES
No bleeding or unusual bruising.  Medications and doses verified.  No scheduled procedures at this time.  INR=3.4   Pt admits to eating less greens recently.  Plan: Half dose tomorrow b/c took today's dose then continue same weekly dose.  Follow up INR check in 4 weeks.

## 2024-08-12 DIAGNOSIS — I48.0 PAROXYSMAL A-FIB (MULTI): ICD-10-CM

## 2024-08-12 RX ORDER — DOFETILIDE 0.25 MG/1
CAPSULE ORAL 2 TIMES DAILY
Qty: 180 CAPSULE | Refills: 1 | Status: SHIPPED | OUTPATIENT
Start: 2024-08-12

## 2024-08-22 ENCOUNTER — APPOINTMENT (OUTPATIENT)
Dept: PHARMACY | Facility: CLINIC | Age: 84
End: 2024-08-22
Payer: MEDICARE

## 2024-09-06 ENCOUNTER — ANTICOAGULATION - WARFARIN VISIT (OUTPATIENT)
Dept: PHARMACY | Facility: CLINIC | Age: 84
End: 2024-09-06
Payer: MEDICARE

## 2024-09-06 DIAGNOSIS — I48.0 PAROXYSMAL A-FIB (MULTI): ICD-10-CM

## 2024-09-06 DIAGNOSIS — I48.21 PERMANENT ATRIAL FIBRILLATION (MULTI): Primary | ICD-10-CM

## 2024-09-06 LAB
POC INR: 1.1
POC PROTHROMBIN TIME: NORMAL

## 2024-09-06 PROCEDURE — 99212 OFFICE O/P EST SF 10 MIN: CPT

## 2024-09-06 PROCEDURE — 85610 PROTHROMBIN TIME: CPT | Mod: QW

## 2024-09-06 NOTE — PROGRESS NOTES
No bleeding or unusual bruising.  Medications and doses verified.  Patient had a colonoscopy on 9/3/24 and was off Warfarin for 6 days (8/29-9/3) restarted Warfarin on 9/4.  INR=1.1  Plan: Boost doses x2 then continue same weekly dose.  Follow up INR check in 2 weeks (pt requests 3 weeks).

## 2024-09-18 PROBLEM — I48.21 PERMANENT ATRIAL FIBRILLATION (MULTI): Status: RESOLVED | Noted: 2023-10-13 | Resolved: 2024-09-18

## 2024-09-27 ENCOUNTER — ANTICOAGULATION - WARFARIN VISIT (OUTPATIENT)
Dept: PHARMACY | Facility: CLINIC | Age: 84
End: 2024-09-27
Payer: MEDICARE

## 2024-09-27 DIAGNOSIS — I48.0 PAROXYSMAL ATRIAL FIBRILLATION (MULTI): Primary | ICD-10-CM

## 2024-09-27 LAB
POC INR: 2.4
POC PROTHROMBIN TIME: NORMAL

## 2024-09-27 PROCEDURE — 99212 OFFICE O/P EST SF 10 MIN: CPT

## 2024-09-27 PROCEDURE — 85610 PROTHROMBIN TIME: CPT | Mod: QW

## 2024-09-27 NOTE — PROGRESS NOTES
Coumadin Clinic Visit Note    Patient verified warfarin dose  No missed doses  No unusual bruising or bleeding  No changes to medications  Consistent dietary green intake  No anticipated procedures at this time  INR Therapeutic today at 2.4  No changes to warfarin dose today  Next appointment 5 weeks      Gagan Winn, PharmD

## 2024-09-30 DIAGNOSIS — I25.10 MILD CAD: ICD-10-CM

## 2024-09-30 DIAGNOSIS — E78.00 HYPERCHOLESTEROLEMIA: ICD-10-CM

## 2024-09-30 RX ORDER — ROSUVASTATIN CALCIUM 5 MG/1
5 TABLET, COATED ORAL DAILY
Qty: 90 TABLET | Refills: 3 | Status: SHIPPED | OUTPATIENT
Start: 2024-09-30

## 2024-10-01 ENCOUNTER — TELEPHONE (OUTPATIENT)
Dept: PHARMACY | Facility: CLINIC | Age: 84
End: 2024-10-01
Payer: MEDICARE

## 2024-10-01 DIAGNOSIS — I48.21 PERMANENT ATRIAL FIBRILLATION (MULTI): ICD-10-CM

## 2024-10-01 RX ORDER — WARFARIN 3 MG/1
TABLET ORAL
Qty: 130 TABLET | Refills: 1 | Status: SHIPPED | OUTPATIENT
Start: 2024-10-01

## 2024-10-01 NOTE — TELEPHONE ENCOUNTER
Escribed to .[phone]  Warfarin 3 mg tablet  Take 3 mg on Sun and Wed and 4.5 mg all other days or UD  #130 (90 day) 1 rf  Dr. Wheeler

## 2024-10-09 ENCOUNTER — APPOINTMENT (OUTPATIENT)
Dept: CARDIOLOGY | Facility: CLINIC | Age: 84
End: 2024-10-09
Payer: MEDICARE

## 2024-10-09 VITALS
SYSTOLIC BLOOD PRESSURE: 138 MMHG | DIASTOLIC BLOOD PRESSURE: 72 MMHG | OXYGEN SATURATION: 98 % | WEIGHT: 161.8 LBS | HEART RATE: 53 BPM | BODY MASS INDEX: 28.67 KG/M2 | HEIGHT: 63 IN

## 2024-10-09 DIAGNOSIS — I48.0 PAROXYSMAL ATRIAL FIBRILLATION (MULTI): ICD-10-CM

## 2024-10-09 DIAGNOSIS — E78.00 HYPERCHOLESTEROLEMIA: ICD-10-CM

## 2024-10-09 DIAGNOSIS — R07.89 NON-CARDIAC CHEST PAIN: ICD-10-CM

## 2024-10-09 DIAGNOSIS — I10 HYPERTENSION, BENIGN: ICD-10-CM

## 2024-10-09 DIAGNOSIS — Z95.3 S/P AORTIC VALVE REPLACEMENT WITH BIOPROSTHETIC VALVE: ICD-10-CM

## 2024-10-09 DIAGNOSIS — I25.10 CORONARY ARTERY DISEASE INVOLVING NATIVE CORONARY ARTERY OF NATIVE HEART WITHOUT ANGINA PECTORIS: Primary | ICD-10-CM

## 2024-10-09 PROCEDURE — 3078F DIAST BP <80 MM HG: CPT | Performed by: STUDENT IN AN ORGANIZED HEALTH CARE EDUCATION/TRAINING PROGRAM

## 2024-10-09 PROCEDURE — 99214 OFFICE O/P EST MOD 30 MIN: CPT | Performed by: STUDENT IN AN ORGANIZED HEALTH CARE EDUCATION/TRAINING PROGRAM

## 2024-10-09 PROCEDURE — 3075F SYST BP GE 130 - 139MM HG: CPT | Performed by: STUDENT IN AN ORGANIZED HEALTH CARE EDUCATION/TRAINING PROGRAM

## 2024-10-09 PROCEDURE — 1159F MED LIST DOCD IN RCRD: CPT | Performed by: STUDENT IN AN ORGANIZED HEALTH CARE EDUCATION/TRAINING PROGRAM

## 2024-10-09 PROCEDURE — 1036F TOBACCO NON-USER: CPT | Performed by: STUDENT IN AN ORGANIZED HEALTH CARE EDUCATION/TRAINING PROGRAM

## 2024-10-09 RX ORDER — OMEPRAZOLE 20 MG/1
20 CAPSULE, DELAYED RELEASE ORAL
COMMUNITY
Start: 2024-09-04

## 2024-10-09 NOTE — PROGRESS NOTES
"Chief Complaint:   Hypertension, Coronary Artery Disease, Atrial Fibrillation, and Post-op Valve Replacement (6 month follow up)     History Of Present Illness:    Rios Alston Jr. is a 84 y.o. male Return to clinic for follow-up appointment stable since last appointment.  No recent hospitalizations.  Most recent labs reviewed.  Cholesterol is at goal.  Compliant with meds.  No major side effects.     Last Recorded Vitals:  Vitals:    10/09/24 1038   BP: 138/72   BP Location: Left arm   Patient Position: Sitting   BP Cuff Size: Adult   Pulse: 53   SpO2: 98%   Weight: 73.4 kg (161 lb 12.8 oz)   Height: 1.6 m (5' 3\")       Review of Systems  ROS      Allergies:  Patient has no known allergies.    Outpatient Medications:  Current Outpatient Medications   Medication Instructions    Accu-Chek Marisela Plus test strp strip 1 each, subdermal, Daily    Alphagan P 0.1 % ophthalmic solution 1 drop, Both Eyes, 2 times daily    amLODIPine (NORVASC) 5 mg, oral, Daily    aspirin 81 mg EC tablet 1 tablet, oral, Daily    Breo Ellipta 200-25 mcg/dose inhaler 1 puff, inhalation, Daily    cinacalcet (SENSIPAR) 90 mg, oral, Every Mon/Wed/Fri, Take with food or shortly afer a meal. Swallow tablet whole; do not break or divide.    dofetilide (Tikosyn) 250 mcg capsule oral, 2 times daily    latanoprost (Xalatan) 0.005 % ophthalmic solution 1 drop, Both Eyes, Daily    lisinopril 20 mg, oral, Daily    magnesium oxide (MAG-OX) 400 mg, oral, 2 times daily    metFORMIN  mg 24 hr tablet 1 tablet, oral, 2 times daily    metoprolol tartrate (Lopressor) 50 mg tablet 1 tablet, oral, Every 12 hours    omeprazole (PRILOSEC) 20 mg, oral, Daily RT    rosuvastatin (CRESTOR) 5 mg, oral, Daily    tamsulosin (Flomax) 0.4 mg 24 hr capsule 1 capsule, oral, Daily    warfarin (Coumadin) 3 mg tablet TAKE 3 MG (1 TABLET) EVERY SUNDAY AND WEDNESDAY AND 4.5 MG (1.5 TABLETS) ALL OTHER DAYS OR AS DIRECTED BY THE ANTICOAGULATION CLINIC.       Physical " Exam:  General: No acute distress,  A&O x3  Skin: Warm and dry  Neck: JVD is not elevated  ENT: Moist mucous membranes no lesions appreciated  Pulmonary: CTAB  Cards: Regular rate rhythm, no murmurs gallops or rubs appreciated normal S1-S2  Abdomen: Soft nontender nondistended  Extremities: No edema or cyanosis  Psych: Appropriate mood and affect        Last Labs:  CBC -  Lab Results   Component Value Date    WBC 5.9 08/02/2021    HGB 12.6 (L) 08/02/2021    HCT 38.5 (L) 08/02/2021    MCV 89 08/02/2021     08/02/2021       CMP -  Lab Results   Component Value Date    CALCIUM 11.1 (H) 09/16/2022    PHOS 2.0 (L) 09/16/2022    ALBUMIN 4.2 09/16/2022       LIPID PANEL -   Lab Results   Component Value Date    CHOL 125 09/16/2022    TRIG 137 09/16/2022    HDL 53.0 09/16/2022    CHHDL 2.4 09/16/2022    LDLF 45 09/16/2022    VLDL 27 09/16/2022       RENAL FUNCTION PANEL -   Lab Results   Component Value Date    GLUCOSE 135 (H) 09/16/2022     (L) 09/16/2022    K 4.8 09/16/2022     09/16/2022    CO2 27 09/16/2022    ANIONGAP 12 09/16/2022    BUN 13 09/16/2022    CREATININE 1.31 (H) 09/16/2022    GFRMALE 55 (A) 09/16/2022    CALCIUM 11.1 (H) 09/16/2022    PHOS 2.0 (L) 09/16/2022    ALBUMIN 4.2 09/16/2022        Lab Results   Component Value Date    HGBA1C 7.3 (A) 10/14/2022       Last Cardiology Tests:  ECG:  Encounter Date: 06/24/24   ECG 12 lead (Clinic Performed)    Narrative    Normal sinus rhythm  First degree AV block        Assessment and Plan    1. History of aortic valve disease status post AVR. Most recent echo shows a mean gradient of 11 without significant valvular or perivalvular regurgitation.   Continue aspirin indefinitely. Endocarditis prophylaxis where appropriate.     2. History of mild CAD: No true active angina symptoms. Continue patient on aspirin and statin therapy.  Most recent labs reviewed.     3. Atypical chest pain: Reproducible most consistent with musculoskeletal origin.  Probably related to prior history of median sternotomy from aortic valve surgery and prior fall and injury.     4. Paroxysmal atrial fibrillation: Managed with Tikosyn and metoprolol. Status post PVI at the developing A. fib while on Tikosyn.  Baseline ECG blood pressures blood pressures are sinus bradycardia with first-degree AV block with EKG today.  Anticoagulation with warfarin. Followed by EP.      5. Hypertension: Blood pressure is at goal today.     Follow-up in 1 year     (This note was generated with voice recognition software and may contain errors including spelling, grammar, syntax and missed recognition of what was dictated, of which may not have been fully corrected)    Abundio Wheeler MD PhD

## 2024-11-01 ENCOUNTER — ANTICOAGULATION - WARFARIN VISIT (OUTPATIENT)
Dept: PHARMACY | Facility: CLINIC | Age: 84
End: 2024-11-01
Payer: MEDICARE

## 2024-11-01 DIAGNOSIS — I48.0 PAROXYSMAL ATRIAL FIBRILLATION (MULTI): Primary | ICD-10-CM

## 2024-11-01 LAB
POC INR: 2.6
POC PROTHROMBIN TIME: NORMAL

## 2024-11-01 PROCEDURE — 99212 OFFICE O/P EST SF 10 MIN: CPT | Performed by: PHARMACIST

## 2024-11-01 PROCEDURE — 85610 PROTHROMBIN TIME: CPT | Mod: QW | Performed by: PHARMACIST

## 2024-12-03 ENCOUNTER — HOSPITAL ENCOUNTER (OUTPATIENT)
Dept: RADIOLOGY | Facility: HOSPITAL | Age: 84
Discharge: HOME | End: 2024-12-03
Payer: MEDICARE

## 2024-12-03 DIAGNOSIS — R05.9 COUGH: ICD-10-CM

## 2024-12-03 PROCEDURE — 71046 X-RAY EXAM CHEST 2 VIEWS: CPT

## 2024-12-03 PROCEDURE — 71046 X-RAY EXAM CHEST 2 VIEWS: CPT | Performed by: RADIOLOGY

## 2024-12-06 ENCOUNTER — ANTICOAGULATION - WARFARIN VISIT (OUTPATIENT)
Dept: PHARMACY | Facility: CLINIC | Age: 84
End: 2024-12-06
Payer: MEDICARE

## 2024-12-06 DIAGNOSIS — I48.0 PAROXYSMAL ATRIAL FIBRILLATION (MULTI): Primary | ICD-10-CM

## 2024-12-06 LAB
POC INR: 3.3
POC PROTHROMBIN TIME: NORMAL

## 2024-12-06 PROCEDURE — 85610 PROTHROMBIN TIME: CPT | Mod: QW

## 2024-12-06 PROCEDURE — 99212 OFFICE O/P EST SF 10 MIN: CPT

## 2024-12-06 NOTE — PROGRESS NOTES
No bleeding or unusual bruising.  Medications and doses verified.  No scheduled procedures at this time.  INR=3.3   Plan: Half dose tomorrow b/c took todays then continue same weekly dose.  Follow up INR check in 4 weeks.

## 2025-01-03 ENCOUNTER — ANTICOAGULATION - WARFARIN VISIT (OUTPATIENT)
Dept: PHARMACY | Facility: CLINIC | Age: 85
End: 2025-01-03
Payer: MEDICARE

## 2025-01-03 DIAGNOSIS — I48.0 PAROXYSMAL ATRIAL FIBRILLATION (MULTI): Primary | ICD-10-CM

## 2025-01-03 LAB
POC INR: 1.6
POC PROTHROMBIN TIME: NORMAL

## 2025-01-03 PROCEDURE — 99212 OFFICE O/P EST SF 10 MIN: CPT | Performed by: PHARMACIST

## 2025-01-03 PROCEDURE — 85610 PROTHROMBIN TIME: CPT | Mod: QW | Performed by: PHARMACIST

## 2025-01-03 NOTE — PROGRESS NOTES
Rios Alston Jr. is a 84 y.o. male with history of atrial fibrillation who presents today for anticoagulation monitoring and adjustment.  INR 1.6 is sub-therapeutic for this patient (goal range 2-3) and is reflective of 28.5 mg TWD  Patient verifies current dosing regimen, patient able to verbally recall dose  Patient reports 1  missed doses since last INR - was >1 week ago     Last INR 3.3 on 12/6/24 (4 week interval)  Patient denies s/sx clotting and/or stroke  Patient denies hematuria, epistaxis, rectal bleeding  Patient denies changes in diet, alcohol, or tobacco use  Vegetable intake consistent from week to week  Reviewed medication list and drug allergies with patient, updated any medication additions or modifications accordingly  Acetaminophen intake: no changes   Patient also denies any pending medical or dental procedures scheduled at this time  Patient was instructed to boost with warfarin 6mg today only, then continue with current therapy of warfarin 28.5mg TWD and RTC 3 weeks    Anny Santiago, MarniD, BCPS   1/3/2025 10:42 AM

## 2025-01-24 ENCOUNTER — ANTICOAGULATION - WARFARIN VISIT (OUTPATIENT)
Dept: PHARMACY | Facility: CLINIC | Age: 85
End: 2025-01-24
Payer: MEDICARE

## 2025-01-24 DIAGNOSIS — I48.0 PAROXYSMAL ATRIAL FIBRILLATION (MULTI): Primary | ICD-10-CM

## 2025-01-24 LAB
POC INR: 2.1
POC PROTHROMBIN TIME: NORMAL

## 2025-01-24 PROCEDURE — 99212 OFFICE O/P EST SF 10 MIN: CPT

## 2025-01-24 PROCEDURE — 85610 PROTHROMBIN TIME: CPT | Mod: QW

## 2025-01-24 NOTE — PROGRESS NOTES
Coumadin Clinic Visit Note    Patient verified warfarin dose  No missed doses  No unusual bruising or bleeding  No changes to medications  Consistent dietary green intake  No anticipated procedures at this time  INR Therapeutic today at 2.1  No changes to warfarin dose today  Next appointment 5 weeks      Mildred Chung, PharmD

## 2025-02-15 DIAGNOSIS — I48.0 PAROXYSMAL A-FIB (MULTI): ICD-10-CM

## 2025-02-17 RX ORDER — DOFETILIDE 0.25 MG/1
CAPSULE ORAL 2 TIMES DAILY
Qty: 180 CAPSULE | Refills: 0 | Status: SHIPPED | OUTPATIENT
Start: 2025-02-17

## 2025-02-28 ENCOUNTER — ANTICOAGULATION - WARFARIN VISIT (OUTPATIENT)
Dept: PHARMACY | Facility: CLINIC | Age: 85
End: 2025-02-28
Payer: MEDICARE

## 2025-02-28 DIAGNOSIS — I48.0 PAROXYSMAL ATRIAL FIBRILLATION (MULTI): Primary | ICD-10-CM

## 2025-02-28 LAB
POC INR: 2.5
POC PROTHROMBIN TIME: NORMAL

## 2025-02-28 PROCEDURE — 85610 PROTHROMBIN TIME: CPT | Mod: QW

## 2025-02-28 PROCEDURE — 99212 OFFICE O/P EST SF 10 MIN: CPT

## 2025-03-06 ENCOUNTER — APPOINTMENT (OUTPATIENT)
Dept: AUDIOLOGY | Facility: CLINIC | Age: 85
End: 2025-03-06
Payer: MEDICARE

## 2025-03-06 ENCOUNTER — APPOINTMENT (OUTPATIENT)
Dept: OTOLARYNGOLOGY | Facility: CLINIC | Age: 85
End: 2025-03-06
Payer: MEDICARE

## 2025-03-29 DIAGNOSIS — I48.21 PERMANENT ATRIAL FIBRILLATION (MULTI): ICD-10-CM

## 2025-03-31 RX ORDER — WARFARIN 3 MG/1
TABLET ORAL
Qty: 130 TABLET | Refills: 1 | Status: SHIPPED | OUTPATIENT
Start: 2025-03-31

## 2025-03-31 NOTE — TELEPHONE ENCOUNTER
Escribe to .[phone]  Warfarin 3 mg tablet  Take 3 mg on sun and Wed.  Take 4.5 mg all other days or UD  #130 (90 day)  1 rf  Dr. Abundio Wheeler

## 2025-04-04 ENCOUNTER — ANTICOAGULATION - WARFARIN VISIT (OUTPATIENT)
Dept: PHARMACY | Facility: CLINIC | Age: 85
End: 2025-04-04
Payer: MEDICARE

## 2025-04-04 DIAGNOSIS — I48.0 PAROXYSMAL ATRIAL FIBRILLATION (MULTI): Primary | ICD-10-CM

## 2025-04-04 LAB
POC INR: 2.7
POC PROTHROMBIN TIME: NORMAL

## 2025-04-04 PROCEDURE — 99212 OFFICE O/P EST SF 10 MIN: CPT | Performed by: PHARMACIST

## 2025-04-04 PROCEDURE — 85610 PROTHROMBIN TIME: CPT | Mod: QW | Performed by: PHARMACIST

## 2025-04-04 NOTE — PROGRESS NOTES
Verified current dose with pt.    No new meds or med changes since last visit.  Pt denies unusual bleed/bruise.  No upcoming procedures.  No missed doses  No dietary changes  Inr = 2.7  Continue same dose and check again in 5 weeks.

## 2025-05-09 ENCOUNTER — APPOINTMENT (OUTPATIENT)
Dept: PHARMACY | Facility: CLINIC | Age: 85
End: 2025-05-09
Payer: MEDICARE

## 2025-05-12 ENCOUNTER — APPOINTMENT (OUTPATIENT)
Dept: CARDIOLOGY | Facility: CLINIC | Age: 85
End: 2025-05-12
Payer: MEDICARE

## 2025-05-12 VITALS
HEART RATE: 61 BPM | DIASTOLIC BLOOD PRESSURE: 64 MMHG | HEIGHT: 63 IN | WEIGHT: 167 LBS | SYSTOLIC BLOOD PRESSURE: 120 MMHG | OXYGEN SATURATION: 98 % | BODY MASS INDEX: 29.59 KG/M2

## 2025-05-12 DIAGNOSIS — I48.0 PAROXYSMAL A-FIB (MULTI): ICD-10-CM

## 2025-05-12 DIAGNOSIS — Z79.899 HIGH RISK MEDICATION USE: Primary | ICD-10-CM

## 2025-05-12 DIAGNOSIS — I48.0 PAROXYSMAL ATRIAL FIBRILLATION (MULTI): ICD-10-CM

## 2025-05-12 PROCEDURE — 1036F TOBACCO NON-USER: CPT | Performed by: INTERNAL MEDICINE

## 2025-05-12 PROCEDURE — 93005 ELECTROCARDIOGRAM TRACING: CPT | Performed by: INTERNAL MEDICINE

## 2025-05-12 PROCEDURE — 3078F DIAST BP <80 MM HG: CPT | Performed by: INTERNAL MEDICINE

## 2025-05-12 PROCEDURE — 3074F SYST BP LT 130 MM HG: CPT | Performed by: INTERNAL MEDICINE

## 2025-05-12 PROCEDURE — 93010 ELECTROCARDIOGRAM REPORT: CPT | Performed by: INTERNAL MEDICINE

## 2025-05-12 PROCEDURE — 1159F MED LIST DOCD IN RCRD: CPT | Performed by: INTERNAL MEDICINE

## 2025-05-12 PROCEDURE — 99212 OFFICE O/P EST SF 10 MIN: CPT | Mod: 25 | Performed by: INTERNAL MEDICINE

## 2025-05-12 PROCEDURE — 99214 OFFICE O/P EST MOD 30 MIN: CPT | Performed by: INTERNAL MEDICINE

## 2025-05-12 RX ORDER — DOFETILIDE 0.25 MG/1
250 CAPSULE ORAL 2 TIMES DAILY
Qty: 180 CAPSULE | Refills: 3 | Status: SHIPPED | OUTPATIENT
Start: 2025-05-12 | End: 2026-05-12

## 2025-05-12 NOTE — PROGRESS NOTES
"    History Of Present Illness:      This is an 85-year-old male with a history of atrial fibrillation.  He has been taking dofetilide for maintenance of sinus rhythm.  He has no new cardiac complaints at this time.    Past medical history:     Paroxysmal atrial fibrillation: History of pulmonary vein isolation and antiarrhythmic drug therapy with dofetilide  Mild coronary artery disease  Hypertension  History of aortic valve replacement.    Review of Systems  Other review of systems negative  Last Recorded Vitals:      10/19/2022    11:54 AM 2/27/2023     1:57 PM 8/21/2023    10:41 AM 10/10/2023    10:57 AM 4/10/2024    11:01 AM 6/24/2024    10:46 AM 10/9/2024    10:38 AM   Vitals   Systolic 130 147 116 130 122 112 138   Diastolic 70 77 62 74 66 64 72   BP Location    Right arm Left arm Right arm Left arm   Heart Rate 53 51 67 54 58 59 53   Resp  18        Height  1.575 m (5' 2\") 1.575 m (5' 2\") 1.575 m (5' 2\") 1.575 m (5' 2\") 1.575 m (5' 2\") 1.6 m (5' 3\")   Weight (lb) 166.44 164 160 169 164.8 163 161.8   BMI 30.44 kg/m2 30 kg/m2 29.26 kg/m2 30.91 kg/m2 30.14 kg/m2 29.81 kg/m2 28.66 kg/m2   BSA (m2) 1.82 m2 1.8 m2 1.78 m2 1.83 m2 1.81 m2 1.8 m2 1.81 m2   Visit Report    Report Report Report Report     Allergies:  Patient has no known allergies.  Outpatient Medications:  Current Outpatient Medications   Medication Instructions    Accu-Chek Marisela Plus test strp strip 1 each, subdermal, Daily    Alphagan P 0.1 % ophthalmic solution 1 drop, Both Eyes, 2 times daily    amLODIPine (NORVASC) 5 mg, oral, Daily    aspirin 81 mg EC tablet 1 tablet, oral, Daily    Breo Ellipta 200-25 mcg/dose inhaler 1 puff, inhalation, Daily    cinacalcet (SENSIPAR) 90 mg, oral, Every Mon/Wed/Fri, Take with food or shortly afer a meal. Swallow tablet whole; do not break or divide.    dofetilide (Tikosyn) 250 mcg capsule oral, 2 times daily    latanoprost (Xalatan) 0.005 % ophthalmic solution 1 drop, Both Eyes, Daily    lisinopril 20 mg, " oral, Daily    magnesium oxide (MAG-OX) 400 mg, oral, 2 times daily    metoprolol tartrate (Lopressor) 50 mg tablet 1 tablet, oral, Every 12 hours    omeprazole (PRILOSEC) 20 mg, oral, Daily RT    rosuvastatin (CRESTOR) 5 mg, oral, Daily    tamsulosin (Flomax) 0.4 mg 24 hr capsule 1 capsule, oral, Daily    warfarin (Coumadin) 3 mg tablet Take 3 mg (1 tablet) by mouth every Sunday and Wednesday.  Take 4.5 mg (1.5 tablets) all other days or as directed by the anticoagulation clinic.       Physical Exam:    General Appearance:  Alert, oriented, no distress  Skin:  Warm and dry  Head and Neck:  No elevation of JVP, no carotid bruits  Cardiac Exam:  Rhythm is regular, S1 and S2 are normal, grade 1/6 crescendo decrescendo murmur at the right upper sternal border  Lungs:  Clear to auscultation  Extremities:  no edema  Neurologic:  No focal deficits  Psychiatric:  Appropriate mood and behavior    Lab Results:    CMP:  Recent Labs     10/14/22  1132 09/16/22  1033 12/20/21  1410 10/08/21  0954 05/13/21  1639 05/13/21  0717   NA  --  135* 138 138 141 137   K  --  4.8 5.1 5.1 3.9 4.0   CL  --  101 99 102 108* 103   CO2  --  27 31 28 21 23   ANIONGAP  --  12 13 13 16 15   BUN  --  13 11 19 14 13   CREATININE  --  1.31* 0.96 1.04 0.88 0.91   MG 1.61 1.39* 1.50*  --  1.78  --      Recent Labs     09/16/22  1033   ALBUMIN 4.2     CBC:  Recent Labs     08/02/21  1204 05/13/21  0717   WBC 5.9 6.9   HGB 12.6* 15.5   HCT 38.5* 47.9    201   MCV 89 90     COAG:   Recent Labs     04/04/25  0000 02/28/25  0000 01/24/25  0000 01/03/25  1043 12/06/24  0000 11/01/24  0000 09/27/24  0000 09/06/24  0000   INR 2.70 2.50 2.10 1.60 3.30 2.60 2.40 1.10     Cardiology Tests (personally reviewed):    EKG June 24, 2024: Normal sinus rhythm, first-degree AV block, QTc 409 ms.     Assessment/Plan   Problem List Items Addressed This Visit           ICD-10-CM    Paroxysmal atrial fibrillation (Multi) I48.0    This patient has a history of  symptomatic paroxysmal atrial fibrillation, and he did well with antiarrhythmic drug therapy for several years.  However he then developed persistent atrial fibrillation in 2021 and had pulmonary vein isolation May 13, 2021.  Since then he has been maintaining sinus rhythm and the QTc is stable on dofetilide.  A basic metabolic panel and magnesium level will be checked.  He will follow-up in October with Dr. Wheeler as scheduled and I will see him in the office in 1 year.         Relevant Medications    dofetilide (Tikosyn) 250 mcg capsule    Other Relevant Orders    ECG 12 lead (Clinic Performed) (Completed)    Basic metabolic panel    Magnesium    High risk medication use - Primary Z79.899    No hemorrhagic problems on warfarin.  No side effects on dofetilide.  QTc has been stable.          Other Visit Diagnoses         Codes      Paroxysmal A-fib (Multi)     I48.0    Relevant Medications    dofetilide (Tikosyn) 250 mcg capsule            James C Ramicone, DO

## 2025-05-12 NOTE — ASSESSMENT & PLAN NOTE
This patient has a history of symptomatic paroxysmal atrial fibrillation, and he did well with antiarrhythmic drug therapy for several years.  However he then developed persistent atrial fibrillation in 2021 and had pulmonary vein isolation May 13, 2021.  Since then he has been maintaining sinus rhythm and the QTc is stable on dofetilide.  A basic metabolic panel and magnesium level will be checked.  He will follow-up in October with Dr. Wheeler as scheduled and I will see him in the office in 1 year.

## 2025-05-16 ENCOUNTER — ANTICOAGULATION - WARFARIN VISIT (OUTPATIENT)
Dept: PHARMACY | Facility: CLINIC | Age: 85
End: 2025-05-16
Payer: MEDICARE

## 2025-05-16 DIAGNOSIS — I48.0 PAROXYSMAL ATRIAL FIBRILLATION (MULTI): Primary | ICD-10-CM

## 2025-05-16 LAB
POC INR: 2.6 (ref 0.9–1.1)
POC PROTHROMBIN TIME: ABNORMAL (ref 9.3–12.5)

## 2025-05-16 PROCEDURE — 99212 OFFICE O/P EST SF 10 MIN: CPT | Performed by: PHARMACIST

## 2025-05-16 PROCEDURE — 85610 PROTHROMBIN TIME: CPT | Mod: QW | Performed by: PHARMACIST

## 2025-05-16 NOTE — PROGRESS NOTES
Verified current dose with pt.    No new meds or med changes since last visit.  Pt denies unusual bleed/bruise.  No upcoming procedures.  No missed doses  No dietary changes  Inr = 2.6  Continue same dose and check again in 5 weeks.

## 2025-05-18 LAB
ATRIAL RATE: 60 BPM
P AXIS: 44 DEGREES
P OFFSET: 166 MS
P ONSET: 113 MS
PR INTERVAL: 222 MS
Q ONSET: 224 MS
QRS COUNT: 10 BEATS
QRS DURATION: 86 MS
QT INTERVAL: 430 MS
QTC CALCULATION(BAZETT): 430 MS
QTC FREDERICIA: 430 MS
R AXIS: 5 DEGREES
T AXIS: 45 DEGREES
T OFFSET: 439 MS
VENTRICULAR RATE: 60 BPM

## 2025-06-20 ENCOUNTER — ANTICOAGULATION - WARFARIN VISIT (OUTPATIENT)
Dept: PHARMACY | Facility: CLINIC | Age: 85
End: 2025-06-20
Payer: MEDICARE

## 2025-06-20 DIAGNOSIS — I48.0 PAROXYSMAL ATRIAL FIBRILLATION (MULTI): Primary | ICD-10-CM

## 2025-06-20 LAB
POC INR: 2 (ref 0.9–1.1)
POC PROTHROMBIN TIME: ABNORMAL (ref 9.3–12.5)

## 2025-06-20 PROCEDURE — 99212 OFFICE O/P EST SF 10 MIN: CPT | Performed by: PHARMACIST

## 2025-06-20 PROCEDURE — 85610 PROTHROMBIN TIME: CPT | Mod: QW | Performed by: PHARMACIST

## 2025-06-20 NOTE — PROGRESS NOTES
Coumadin Clinic Visit Note    Patient presents today for anticoagulation monitoring and adjustment.  Patient verified warfarin dosing schedule  Patient denies missing any doses  Patient denies unusual bruising or bleeding  Patient denies changes to medications, alcohol or tobacco use.  Consistent dietary green intake  There are no anticipated procedures at this time  INR Therapeutic today at 2.0  No changes to warfarin dose today  Next appointment 5 weeks      Madai Arredondo, PharmD

## 2025-07-15 DIAGNOSIS — I48.0 PAROXYSMAL ATRIAL FIBRILLATION (MULTI): ICD-10-CM

## 2025-07-17 ENCOUNTER — HOSPITAL ENCOUNTER (OUTPATIENT)
Dept: RADIOLOGY | Facility: HOSPITAL | Age: 85
Discharge: HOME | End: 2025-07-17
Payer: MEDICARE

## 2025-07-17 DIAGNOSIS — R05.1 ACUTE COUGH: ICD-10-CM

## 2025-07-17 DIAGNOSIS — I48.0 PAROXYSMAL A-FIB (MULTI): ICD-10-CM

## 2025-07-17 PROCEDURE — 71046 X-RAY EXAM CHEST 2 VIEWS: CPT | Performed by: RADIOLOGY

## 2025-07-17 PROCEDURE — 71046 X-RAY EXAM CHEST 2 VIEWS: CPT

## 2025-07-23 RX ORDER — LANOLIN ALCOHOL/MO/W.PET/CERES
1 CREAM (GRAM) TOPICAL 2 TIMES DAILY
Qty: 180 TABLET | Refills: 3 | Status: SHIPPED | OUTPATIENT
Start: 2025-07-23

## 2025-07-25 ENCOUNTER — ANTICOAGULATION - WARFARIN VISIT (OUTPATIENT)
Dept: PHARMACY | Facility: CLINIC | Age: 85
End: 2025-07-25
Payer: MEDICARE

## 2025-07-25 DIAGNOSIS — I48.0 PAROXYSMAL ATRIAL FIBRILLATION (MULTI): Primary | ICD-10-CM

## 2025-07-25 LAB
POC INR: 2.6 (ref 0.9–1.1)
POC PROTHROMBIN TIME: ABNORMAL (ref 9.3–12.5)

## 2025-07-25 PROCEDURE — 85610 PROTHROMBIN TIME: CPT | Mod: QW

## 2025-07-25 PROCEDURE — 99212 OFFICE O/P EST SF 10 MIN: CPT

## 2025-07-25 NOTE — PROGRESS NOTES
Coumadin Clinic Visit Note    Patient verified warfarin dose  No missed doses  No unusual bruising or bleeding  No changes to medications  Consistent dietary green intake  No anticipated procedures at this time  INR Therapeutic today at 2.6  No changes to warfarin dose today  Next appointment 5 weeks      Mildred Chung, PharmD

## 2025-08-29 ENCOUNTER — ANTICOAGULATION - WARFARIN VISIT (OUTPATIENT)
Dept: PHARMACY | Facility: CLINIC | Age: 85
End: 2025-08-29
Payer: MEDICARE

## 2025-08-29 DIAGNOSIS — I48.0 PAROXYSMAL ATRIAL FIBRILLATION (MULTI): Primary | ICD-10-CM

## 2025-08-29 LAB
POC INR: 3 (ref 0.9–1.1)
POC PROTHROMBIN TIME: ABNORMAL (ref 9.3–12.5)

## 2025-08-29 PROCEDURE — 99212 OFFICE O/P EST SF 10 MIN: CPT

## 2025-08-29 PROCEDURE — 85610 PROTHROMBIN TIME: CPT | Mod: QW

## 2025-10-08 ENCOUNTER — APPOINTMENT (OUTPATIENT)
Dept: CARDIOLOGY | Facility: CLINIC | Age: 85
End: 2025-10-08
Payer: MEDICARE